# Patient Record
(demographics unavailable — no encounter records)

---

## 2025-06-23 NOTE — PHYSICAL EXAM
[Normal Gait and Station] : normal gait and station [Normal muscle strength, symmetry and tone of facial, head and neck musculature] : normal muscle strength, symmetry and tone of facial, head and neck musculature [Normal] : no cervical lymphadenopathy [1+] : 1+ [Increased Work of Breathing] : no increased work of breathing with use of accessory muscles and retractions [de-identified] : tongue tie

## 2025-06-23 NOTE — HISTORY OF PRESENT ILLNESS
[No Personal or Family History of Easy Bruising, Bleeding, or Issues with General Anesthesia] : No Personal or Family History of easy bruising, bleeding, or issues with general anesthesia [de-identified] : 12 day old male here with parents referred by Dr. Farias for tongue tie evaluation   Infant is bottle fed bc mother states she wants to breast feed but had difficulty latching  No maternal discomfort  No choking with feeds, no frequent spit ups   Gaining appropriate weight, birth weight 8.14lbs  No snoring, no nasal congestion  Passed [facility]

## 2025-06-23 NOTE — REVIEW OF SYSTEMS
[Negative] : Heme/Lymph [de-identified] : as per HPI  [de-identified] : as per HPI  [FreeTextEntry4] : as per HPI

## 2025-06-23 NOTE — PROCEDURE
[FreeTextEntry1] : Frenulectomy [FreeTextEntry2] : Tongue Tie [FreeTextEntry3] : After informed consent is obtained the tongue is retracted dorsally. A clamp is placed dorsal to the outflow tracts of the submandibular ducts along the lingual frenulum. The clamp is left in place for 30 seconds. The clamp is removed. A scissor is utilized to divide and excise a small portion of the lingual frenulum dorsal to the outflow tracts of the submandibular ducts. Hemostasis is achieved with digital pressure. The child was observed in the office for 15 minutes following the procedure with no evidence of bleeding

## 2025-06-23 NOTE — CONSULT LETTER
[Dear  ___] : Dear  [unfilled], [Courtesy Letter:] : I had the pleasure of seeing your patient, [unfilled], in my office today. [Please see my note below.] : Please see my note below. [Consult Closing:] : Thank you very much for allowing me to participate in the care of this patient.  If you have any questions, please do not hesitate to contact me. [Sincerely,] : Sincerely, [FreeTextEntry2] : [place] [address]   [phone] [FreeTextEntry3] : Can Herrera MD Chief, Pediatric Otolaryngology [facility] and Rose Marie Geller [facility] Professor of Otolaryngology [employer] [facility] [employer] at [facility]

## 2025-06-23 NOTE — BIRTH HISTORY
[At ___ Weeks Gestation] : at [unfilled] weeks gestation [ Section] : by  section [Passed] : passed [de-identified] : NICU x1day for hypoglycemia [de-identified] : gestational diabetes